# Patient Record
Sex: MALE | Race: ASIAN | NOT HISPANIC OR LATINO | Employment: OTHER | URBAN - METROPOLITAN AREA
[De-identification: names, ages, dates, MRNs, and addresses within clinical notes are randomized per-mention and may not be internally consistent; named-entity substitution may affect disease eponyms.]

---

## 2021-06-11 ENCOUNTER — OFFICE VISIT (OUTPATIENT)
Dept: URGENT CARE | Facility: CLINIC | Age: 71
End: 2021-06-11
Payer: MEDICARE

## 2021-06-11 VITALS
RESPIRATION RATE: 16 BRPM | HEART RATE: 67 BPM | HEIGHT: 70 IN | OXYGEN SATURATION: 97 % | BODY MASS INDEX: 31.24 KG/M2 | TEMPERATURE: 97.7 F | WEIGHT: 218.2 LBS | SYSTOLIC BLOOD PRESSURE: 120 MMHG | DIASTOLIC BLOOD PRESSURE: 74 MMHG

## 2021-06-11 DIAGNOSIS — S61.211A LACERATION OF LEFT INDEX FINGER WITHOUT FOREIGN BODY WITHOUT DAMAGE TO NAIL, INITIAL ENCOUNTER: ICD-10-CM

## 2021-06-11 PROBLEM — R50.9 FEVER WITH EXPOSURE TO COVID-19 VIRUS: Status: ACTIVE | Noted: 2020-06-01

## 2021-06-11 PROBLEM — M79.669 CALF TENDERNESS: Status: ACTIVE | Noted: 2021-02-22

## 2021-06-11 PROBLEM — M77.30 CALCANEAL SPUR: Status: ACTIVE | Noted: 2020-07-28

## 2021-06-11 PROBLEM — R05.9 COUGH: Status: ACTIVE | Noted: 2020-07-10

## 2021-06-11 PROBLEM — B35.1 ONYCHOMYCOSIS OF TOENAIL: Status: ACTIVE | Noted: 2020-02-04

## 2021-06-11 PROBLEM — K76.0 FATTY LIVER: Status: ACTIVE | Noted: 2020-12-14

## 2021-06-11 PROBLEM — M25.579 PAIN AND SWELLING OF ANKLE: Status: ACTIVE | Noted: 2020-07-27

## 2021-06-11 PROBLEM — G31.9 CEREBRAL ATROPHY (HCC): Status: ACTIVE | Noted: 2020-01-31

## 2021-06-11 PROBLEM — R10.9 ABDOMINAL PAIN: Status: ACTIVE | Noted: 2020-02-24

## 2021-06-11 PROBLEM — Z71.84 ENCOUNTER FOR COUNSELING FOR TRAVEL: Status: ACTIVE | Noted: 2018-06-25

## 2021-06-11 PROBLEM — M25.473 PAIN AND SWELLING OF ANKLE: Status: ACTIVE | Noted: 2020-07-27

## 2021-06-11 PROBLEM — R31.9 HEMATURIA: Status: ACTIVE | Noted: 2020-02-24

## 2021-06-11 PROBLEM — Z20.822 FEVER WITH EXPOSURE TO COVID-19 VIRUS: Status: ACTIVE | Noted: 2020-06-01

## 2021-06-11 PROBLEM — S92.912A TOE FRACTURE, LEFT: Status: ACTIVE | Noted: 2020-01-02

## 2021-06-11 PROBLEM — M79.676 TOE PAIN: Status: ACTIVE | Noted: 2020-02-06

## 2021-06-11 PROCEDURE — 99203 OFFICE O/P NEW LOW 30 MIN: CPT | Performed by: NURSE PRACTITIONER

## 2021-06-11 RX ORDER — ATORVASTATIN CALCIUM 80 MG/1
80 TABLET, FILM COATED ORAL NIGHTLY
COMMUNITY

## 2021-06-11 RX ORDER — LEVOTHYROXINE SODIUM 0.15 MG/1
150 TABLET ORAL DAILY
COMMUNITY
Start: 2021-06-02

## 2021-06-11 RX ORDER — LOSARTAN POTASSIUM 50 MG/1
50 TABLET ORAL DAILY
COMMUNITY
Start: 2021-06-02

## 2021-06-11 RX ORDER — BENAZEPRIL HYDROCHLORIDE 10 MG/1
TABLET ORAL
COMMUNITY

## 2021-06-11 ASSESSMENT — ENCOUNTER SYMPTOMS
NAUSEA: 0
SORE THROAT: 0
SHORTNESS OF BREATH: 0
MYALGIAS: 0
FEVER: 0
DIZZINESS: 0
ROS SKIN COMMENTS: LACERATION LEFT INDEX FINGER
VOMITING: 0
EYE REDNESS: 0
CHILLS: 0

## 2021-06-12 NOTE — PROGRESS NOTES
"Subjective:   Sherice Gonzalez is a 70 y.o. male who presents for Laceration (index (L) x today)      Laceration   The incident occurred less than 1 hour ago. The laceration is located on the left hand. Size: 0.4cm. The laceration mechanism was a dirty knife. The pain is at a severity of 0/10. The patient is experiencing no pain. He reports no foreign bodies present. His tetanus status is unknown.       Review of Systems   Constitutional: Negative for chills and fever.   HENT: Negative for sore throat.    Eyes: Negative for redness.   Respiratory: Negative for shortness of breath.    Cardiovascular: Negative for chest pain.   Gastrointestinal: Negative for nausea and vomiting.   Genitourinary: Negative for dysuria.   Musculoskeletal: Negative for myalgias.   Skin: Negative for rash.        Laceration left index finger     Neurological: Negative for dizziness.       Medications:    • atorvastatin  • levothyroxine Tabs  • losartan Tabs    Allergies: Other drug    Problem List: Sherice Gonzaelz does not have a problem list on file.    Surgical History:  No past surgical history on file.    Past Social Hx: Sherice Gonzalez       Past Family Hx:  Sherice Gonzalez family history is not on file.     Problem list, medications, and allergies reviewed by myself today in Epic.     Objective:     /74 (BP Location: Left arm, Patient Position: Sitting, BP Cuff Size: Adult)   Pulse 67   Temp 36.5 °C (97.7 °F) (Temporal)   Resp 16   Ht 1.778 m (5' 10\")   Wt 99 kg (218 lb 3.2 oz)   SpO2 97%   BMI 31.31 kg/m²     Physical Exam  Constitutional:       Appearance: Normal appearance. He is not ill-appearing or toxic-appearing.   HENT:      Head: Normocephalic.      Right Ear: External ear normal.      Left Ear: External ear normal.      Nose: Nose normal.      Mouth/Throat:      Lips: Pink.      Mouth: Mucous membranes are moist.   Eyes:      General: Lids are normal.         Right eye: No discharge.         Left eye: No discharge. "   Pulmonary:      Effort: Pulmonary effort is normal. No accessory muscle usage or respiratory distress.   Musculoskeletal:         General: Normal range of motion.      Cervical back: Full passive range of motion without pain.   Skin:     Coloration: Skin is not pale.      Findings: Laceration (Superficial laceration to the left index finger on the radial aspect.  No deep structures involved, no foreign bodies) present.   Neurological:      Mental Status: He is alert and oriented to person, place, and time.   Psychiatric:         Mood and Affect: Mood normal.         Thought Content: Thought content normal.         Assessment/Plan:     Diagnosis and associated orders:     1. Laceration of left index finger without foreign body without damage to nail, initial encounter        Comments/MDM:     • Patient is a 70-year-old male present with the stated above, superficial laceration noted, wound is irrigated with NS and chlorhexidine was, no repair indicated.  Bandage applied.  Patient is up-to-date on tetanus.  Discussed wound care.  • Differential diagnosis, natural history, supportive care, and indications for immediate follow-up discussed.         Please note that this dictation was created using voice recognition software. I have made a reasonable attempt to correct obvious errors, but I expect that there are errors of grammar and possibly content that I did not discover before finalizing the note.    This note was electronically signed by Silas WINTERS.